# Patient Record
Sex: FEMALE | Race: WHITE | Employment: STUDENT | URBAN - METROPOLITAN AREA
[De-identification: names, ages, dates, MRNs, and addresses within clinical notes are randomized per-mention and may not be internally consistent; named-entity substitution may affect disease eponyms.]

---

## 2017-02-22 DIAGNOSIS — F32.A ANXIETY AND DEPRESSION: ICD-10-CM

## 2017-02-22 DIAGNOSIS — F41.9 ANXIETY AND DEPRESSION: ICD-10-CM

## 2017-02-24 RX ORDER — FLUOXETINE HYDROCHLORIDE 20 MG/1
CAPSULE ORAL
Qty: 90 CAP | Refills: 1 | Status: SHIPPED | OUTPATIENT
Start: 2017-02-24

## 2018-08-06 ENCOUNTER — OFFICE VISIT (OUTPATIENT)
Dept: DERMATOLOGY | Facility: AMBULATORY SURGERY CENTER | Age: 23
End: 2018-08-06

## 2018-08-06 VITALS
BODY MASS INDEX: 26.37 KG/M2 | SYSTOLIC BLOOD PRESSURE: 100 MMHG | WEIGHT: 174 LBS | HEART RATE: 74 BPM | HEIGHT: 68 IN | RESPIRATION RATE: 12 BRPM | DIASTOLIC BLOOD PRESSURE: 60 MMHG | OXYGEN SATURATION: 97 %

## 2018-08-06 DIAGNOSIS — B35.3 TINEA PEDIS, LEFT: ICD-10-CM

## 2018-08-06 DIAGNOSIS — D48.5 NEOPLASM OF UNCERTAIN BEHAVIOR OF SKIN OF NECK: ICD-10-CM

## 2018-08-06 DIAGNOSIS — D22.9 MULTIPLE BENIGN NEVI: Primary | ICD-10-CM

## 2018-08-06 DIAGNOSIS — D48.5 NEOPLASM OF UNCERTAIN BEHAVIOR OF SKIN OF CHEST: ICD-10-CM

## 2018-08-06 RX ORDER — GLYCOPYRROLATE 2 MG/1
TABLET ORAL
COMMUNITY
Start: 2018-07-25

## 2018-08-06 NOTE — MR AVS SNAPSHOT
[facility] [address] 
[phone] Patient: Ilsa Jacobs MRN: [id number] Visit Information Date & Time Provider Department Dept. Phone Encounter #  
 8/6/2018  9:30 AM STEVEN Elise 8057 [phone] [id number] Upcoming Health Maintenance Date Due  
 PAP AKA CERVICAL CYTOLOGY 4/23/2017 Influenza Age 5 to Adult 8/1/2018 DTaP/Tdap/Td series (7 - Td) 7/9/2019 Allergies as of 8/6/2018  Review Complete On: 8/6/2018 By: Mike Griffith No Known Allergies Current Immunizations  Reviewed on 9/14/2015 Name Date DTaP 7/18/2001, 11/13/1997, 1996, 1996, 1996 HPV (9-valent) 7/7/2016, 9/2/2015 HPV (Quad) 8/15/2014 Hep A Vaccine 7/9/2009 Hep A Vaccine 2 Dose Schedule (Ped/Adol) 8/15/2014 Hep B Vaccine 1996, 1996, 1996 Hib 4/24/1997, 1996, 1996, 1996 MMR 8/18/2001, 4/24/1997 Meningococcal (MCV4P) Vaccine 8/15/2014 Meningococcal ACWY Vaccine 7/9/2009 Poliovirus vaccine 7/18/2000, 1996, 1996, 1996 Tdap 7/9/2009 Varicella Virus Vaccine 7/9/2009, 4/24/1997 Not reviewed this visit Vitals BP Pulse Resp Height(growth percentile) Weight(growth percentile) SpO2  
 100/60 (BP 1 Location: Left arm, BP Patient Position: Sitting) 74 12 5' 7.5\" (1.715 m) 174 lb (78.9 kg) 97% BMI OB Status Smoking Status 26.85 kg/m2 Having regular periods Never Smoker BMI and BSA Data Body Mass Index Body Surface Area  
 26.85 kg/m 2 1.94 m 2 Preferred Pharmacy Pharmacy Name Phone [facility], [address] [phone] Your Updated Medication List  
  
   
This list is accurate as of 8/6/18  9:38 AM.  Always use your most recent med list.  
  
  
  
  
 aluminum chloride 20 % external solution Commonly known as:  DRYSOL Prescription anti-perspirant:  Apply as directed nightly for one week. Wash off in morning. fexofenadine 180 mg tablet Commonly known as:  Rae Holstein Take 1 Tab by mouth daily. FLUoxetine 20 mg capsule Commonly known as:  PROzac TAKE ONE CAPSULE BY MOUTH ONCE DAILY  
  
 glycopyrrolate 2 mg tablet  
  
 ibuprofen 600 mg tablet Commonly known as:  MOTRIN Take 1 Tab by mouth every eight (8) hours as needed for Pain (headache). LO LOESTRIN FE 1 mg-10 mcg (24)/10 mcg (2) Tab Generic drug:  norethindrone-e.estradiol-iron Take  by mouth. ondansetron 4 mg disintegrating tablet Commonly known as:  ZOFRAN ODT Take 1 Tab by mouth every eight (8) hours as needed for Nausea (headache). Patient Instructions Self Skin Exam and Sunscreens Early detection and treatment is essential in the treatment of all forms of skin cancer. If caught early, all forms of skin cancer are curable. In addition to your regular visits, you should perform a monthly skin examination. Over time, you become familiar with what is normally found on your skin and can identify new or suspicious spots. One of the screening tools you can use to assess your skin is to follow the ABCDEs: 
 
A= Asymmetry (One half is unlike the other half) B= Border (An irregular, scalloped or poorly defined edge) C= Color (Is varied from one area to another, has shades of tan, brown/ black,       white, red or blue) D= Diameter (Spots larger than 6mm or a pencil eraser) E= Evolving (New spots or one that is changing in size, shape, or color) A follow- up interval will be customized based on your history of skin cancer or level of skin damage and risk factors. In any case, if you notice a suspicious or new spot, an appointment should be arranged between regular visits. Everyone should use sunscreen and sun-safe practices, which is especially important for those with a personal or family history of skin cancer. Suggestions for this include: 1. Use daily moisturizers containing SPF 30 or higher. 2. Wear long sleeve clothing with UPF ratings and a broad-brimmed hat. 3. Apply sunscreen with SPF 30 or higher to all sun exposed areas if you are going to be in the sun. A broad spectrum UVA/ UVB sunscreen is best.  Dont forget to REAPPLY every two hours or more often if swimming or sweating! 4. Avoid outside activities during peak sun hours, especially in the summer (10am- 2pm). 5. DO NOT use tanning beds. Using sunscreen and sun-safe practices can help reduce the likelihood of developing skin cancer or additional skin cancers in those previously diagnosed. Introducing [facility]! Dear Triston Chester: Thank you for requesting a Endoc[employer] account. Our records indicate that you already have an active Endoclear account. You can access your account anytime at [email or web address] Did you know that you can access your hospital and ER discharge instructions at any time in Endoclear? You can also review all of your test results from your hospital stay or ER visit. Additional Information If you have questions, please visit the Frequently Asked Questions section of the Endoclear website at [email or web address] Remember, Endoclear is NOT to be used for urgent needs. For medical emergencies, dial 911. Now available from your iPhone and Android! Please provide this summary of care documentation to your next provider. Your primary care clinician is listed as [facility]. If you have any questions after today's visit, please call [phone].

## 2018-08-06 NOTE — PROGRESS NOTES
Written by Nieves Costa, as dictated by Cathie Bryan, [address]. Name: Javier De La Fuente       Age: 26 y.o. Date: 8/6/2018    Chief Complaint:   Chief Complaint   Patient presents with    Skin Exam       Subjective:    HPI  Ms. Javier De La Fuente is a 26 y.o. female who presents for a partial skin exam.  The patient's last skin exam was on 07/28/16 and the patient does have current complaints related to her skin. She has multiple nevi that she reports are itchy and she scratches until they bleed on her right chest, left lower leg, and left neck. She also reports dry and peeling skin in between her toes. The patient's pertinent skin history includes : nevus excision upper back x1 - benign per patient report. ROS: Constitutional: Negative. Dermatological : positive for - skin lesion changes    Social History     Social History    Marital status: SINGLE     Spouse name: N/A    Number of children: N/A    Years of education: N/A     Occupational History    Not on file. Social History Main Topics    Smoking status: Never Smoker    Smokeless tobacco: Never Used    Alcohol use No    Drug use: No    Sexual activity: Yes     Partners: Male     Birth control/ protection: Implant     Other Topics Concern    Not on file     Social History Narrative       Family History   Problem Relation Age of Onset    Breast Cancer Maternal Grandmother     Colon Cancer Maternal Grandfather     Arthritis-rheumatoid Paternal Grandmother        Past Medical History:   Diagnosis Date    Family history of skin cancer        No past surgical history on file. Current Outpatient Prescriptions   Medication Sig Dispense Refill    FLUoxetine (PROZAC) 20 mg capsule TAKE ONE CAPSULE BY MOUTH ONCE DAILY 90 Cap 1    aluminum chloride (DRYSOL) 20 % external solution Prescription anti-perspirant:  Apply as directed nightly for one week. Wash off in morning.  60 mL 2    ibuprofen (MOTRIN) 600 mg tablet Take 1 Tab by mouth every eight (8) hours as needed for Pain (headache). 50 Tab 1    ondansetron (ZOFRAN ODT) 4 mg disintegrating tablet Take 1 Tab by mouth every eight (8) hours as needed for Nausea (headache). 10 Tab 2    fexofenadine (ALLEGRA) 180 mg tablet Take 1 Tab by mouth daily. 30 Tab 3       No Known Allergies      Objective:    Visit Vitals    /60 (BP 1 Location: Left arm, BP Patient Position: Sitting)    Pulse 74    Resp 12    Ht 5' 7.5\" (1.715 m)    Wt 78.9 kg (174 lb)    SpO2 97%    BMI 26.85 kg/m2       Eulalia Harper is a 26 y.o. female who appears well and in no distress. She is awake, alert, and oriented. There is no preauricular, submandibular, or cervical lymphadenopathy. A skin examination was performed including her scalp, face (including eyelids), ears, neck, chest, abdomen, upper extremities (including digits/nails), lower extremities, breasts,; genital skin was not examined. She has two pink and brown macules on her left upper arm and posterior left distal thigh both with fried egg appearance, no concerning features. She has pink papule on her right lower chest wall consistent with a nevus, 7x 5 mm. She has a pink and brown papule, 5 x 3 mm on her left neck consistent with a nevus. She has scaling of skin between her toes on the left foot consistent with tinea pedis. Right lower chest wall                             Left neck                          Assessment/Plan:    1. Normal nevi. The diagnosis of normal nevi was reviewed. I discussed sun protection, sunscreen use, the warning signs of skin cancer, the need for self-skin examinations, and the need for regular practitioner exams every 2 years. The patient should follow up sooner as needed if new, changing, or symptomatic skin lesions arise. 2. Neoplasm of uncertain behavior, right lower chest wall. A shave removal was advised to address this lesion.   The procedure was reviewed and verbal and written consent were obtained. The risks of pain, bleeding, infection, recurrence and scar were discussed. I performed the procedure. The site was cleansed and anesthetized with 1% Lidocaine with Epinephrine 1:100,000. A shave removal was performed to remove the lesion in its clinical entirety. Drysol was used for hemostasis. The wound was bandaged and care reviewed. The specimen was sent to pathology. I will contact the patient with the results and any further treatment that may be necessary. 3. Neoplasm of uncertain behavior, left neck. A shave removal was advised to address this lesion. The procedure was reviewed and verbal and written consent were obtained. The risks of pain, bleeding, infection, recurrence and scar were discussed. I performed the procedure. The site was cleansed and anesthetized with 1% Lidocaine with Epinephrine 1:100,000. A shave removal was performed to remove the lesion in its clinical entirety. Drysol was used for hemostasis. The wound was bandaged and care reviewed. The specimen was sent to pathology. I will contact the patient with the results and any further treatment that may be necessary. 4. Tinea pedis. I recommended the use of OTC athlete's foot cream for multiple weeks. This plan was reviewed with the patient and patient agrees. All questions were answered. This scribe documentation was reviewed by me and accurately reflects the examination and decisions made by me. [facility]   OFFICE PROCEDURE PROGRESS NOTE   Chart reviewed for the following:   Lily CASTILLO, [address], have reviewed the History, Physical and updated the Allergic reactions for [address] [place] [address]. TIME OUT performed immediately prior to start of procedure:   Michelle CASTILLO, have performed the following reviews on [address] [place] [address]   prior to the start of the procedure:     * Patient was identified by name and date of birth   * Agreement on procedure being performed was verified   * Risks and Benefits explained to the patient   * Procedure site verified and marked as necessary   * Patient was positioned for comfort   * Consent was signed and verified     Time: 9:50 AM  Date of procedure: 8/6/2018  Procedure performed by: Blanquita Gilbert  Provider assisted by: Emily Abdul LPN   Patient assisted by: self   How tolerated by patient: tolerated the procedure well with no complications   Comments: none

## 2018-08-09 LAB
DX ICD CODE: NORMAL
DX ICD CODE: NORMAL
PATH REPORT.FINAL DX SPEC: NORMAL
PATH REPORT.GROSS SPEC: NORMAL
PATH REPORT.MICROSCOPIC SPEC OTHER STN: NORMAL
PATH REPORT.SITE OF ORIGIN SPEC: NORMAL
PATHOLOGIST NAME: NORMAL
PAYMENT PROCEDURE: NORMAL

## 2018-12-12 ENCOUNTER — OFFICE VISIT (OUTPATIENT)
Dept: INTERNAL MEDICINE CLINIC | Age: 23
End: 2018-12-12

## 2018-12-12 VITALS
TEMPERATURE: 98.4 F | HEART RATE: 79 BPM | RESPIRATION RATE: 18 BRPM | HEIGHT: 68 IN | OXYGEN SATURATION: 100 % | WEIGHT: 183 LBS | SYSTOLIC BLOOD PRESSURE: 110 MMHG | DIASTOLIC BLOOD PRESSURE: 81 MMHG | BODY MASS INDEX: 27.74 KG/M2

## 2018-12-12 DIAGNOSIS — Z11.1 SCREENING-PULMONARY TB: ICD-10-CM

## 2018-12-12 DIAGNOSIS — Z23 ENCOUNTER FOR IMMUNIZATION: ICD-10-CM

## 2018-12-12 DIAGNOSIS — Z00.00 ANNUAL PHYSICAL EXAM: Primary | ICD-10-CM

## 2018-12-12 NOTE — PROGRESS NOTES
HISTORY OF PRESENT ILLNESS  Orval Day is a 26 y.o. female presents for annual physical exam  HPI  Yesterday had gyn annual visit. 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  FLUoxetine (PROZAC) 20 mg capsule TAKE ONE CAPSULE BY MOUTH ONCE DAILY 90 Cap 1    aluminum chloride (DRYSOL) 20 % external solution Prescription anti-perspirant:  Apply as directed nightly for one week. Wash off in morning. 60 mL 2    ibuprofen (MOTRIN) 600 mg tablet Take 1 Tab by mouth every eight (8) hours as needed for Pain (headache). 50 Tab 1    ondansetron (ZOFRAN ODT) 4 mg disintegrating tablet Take 1 Tab by mouth every eight (8) hours as needed for Nausea (headache). 10 Tab 2    fexofenadine (ALLEGRA) 180 mg tablet Take 1 Tab by mouth daily. 30 Tab 3     No current facility-administered medications on file prior to visit. History reviewed. No pertinent surgical history. .  Family History   Problem Relation Age of Onset    Breast Cancer Maternal Grandmother     Colon Cancer Maternal Grandfather     Arthritis-rheumatoid Paternal Grandmother      Review of Systems   Constitutional: Positive for weight loss (intentional). HENT: Negative. Eyes: Negative. Respiratory: Negative. Cardiovascular: Negative. Gastrointestinal: Negative. Genitourinary: Negative. Musculoskeletal: Negative. Skin: Negative. Neurological: Negative. Endo/Heme/Allergies: Negative. Psychiatric/Behavioral: Negative. Visit Vitals  /81 (BP 1 Location: Left arm, BP Patient Position: Sitting)   Pulse 79   Temp 98.4 °F (36.9 °C) (Oral)   Resp 18   Ht 5' 7.5\" (1.715 m)   Wt 183 lb (83 kg)   SpO2 100%   BMI 28.24 kg/m²     Physical Exam   Constitutional: She is oriented to person, place, and time. She appears well-developed and well-nourished. No distress. HENT:   Right Ear: External ear normal.   Left Ear: External ear normal.   Nose: Nose normal.   Mouth/Throat: Oropharynx is clear and moist. No oropharyngeal exudate. Eyes: Conjunctivae are normal. Right eye exhibits no discharge. Left eye exhibits no discharge. Neck: Normal range of motion. Neck supple. Cardiovascular: Normal rate, regular rhythm and normal heart sounds. Pulmonary/Chest: Effort normal and breath sounds normal.   Abdominal: Soft. Bowel sounds are normal.   Lymphadenopathy:     She has no cervical adenopathy. Neurological: She is alert and oriented to person, place, and time. Skin: Skin is warm and dry. No rash noted. She is not diaphoretic. No erythema. Psychiatric: She has a normal mood and affect. Her behavior is normal. Judgment and thought content normal.       ASSESSMENT and PLAN    ICD-10-CM ICD-9-CM    1. Annual physical exam Y09.34 V56.2 METABOLIC PANEL, COMPREHENSIVE      AMB POC URINALYSIS DIP STICK AUTO W/O MICRO      TSH RFX ON ABNORMAL TO FREE T4   2.  Encounter for immunization Z23 V03.89 INFLUENZA VIRUS VAC QUAD,SPLIT,PRESV FREE SYRINGE IM      CO IMMUNIZ ADMIN,1 SINGLE/COMB VAC/TOXOID      MEASLES/MUMPS/RUBELLA IMMUNITY      VZV AB, IGG      HEPATITIS B SURF AB QUANT   3. Screening-pulmonary TB Z11.1 V74.1 AMB POC TUBERCULOSIS, INTRADERMAL (SKIN TEST)     current treatment plan is effective, no change in therapy  lab results and schedule of future lab studies reviewed with patient  reviewed diet, exercise and weight control  cardiovascular risk and specific lipid/LDL goals reviewed  reviewed medications and side effects in detail    Encouraged to continue healthy lifestyle, increase physical activity       Patient voices understanding and acceptance of this advice and will call back if any further questions or concerns. An After Visit Summary was printed and given to the patient.

## 2018-12-12 NOTE — PATIENT INSTRUCTIONS

## 2018-12-12 NOTE — PROGRESS NOTES
Exam room R Bita Vega is a 26 y.o. female  Visit Vitals  /81 (BP 1 Location: Left arm, BP Patient Position: Sitting)   Pulse 79   Temp 98.4 °F (36.9 °C) (Oral)   Resp 18   Ht 5' 7.5\" (1.715 m)   Wt 133 lb 3.2 oz (60.4 kg)   SpO2 100%   BMI 20.55 kg/m²     Chief Complaint   Patient presents with    Complete Physical    Labs     titier    Immunization/Injection     1. Have you been to the ER, urgent care clinic since your last visit? Hospitalized since your last visit? yes, UTI in October[facility]      2. Have you seen or consulted any other health care providers outside of the [address] [place] [address] since your last visit? Include any pap smears or colon screening.  No  Health Maintenance Due   Topic Date Due    PAP AKA CERVICAL CYTOLOGY  04/23/2017    Influenza Age 5 to Adult  08/01/2018     Learning Assessment 7/28/2016   PRIMARY LEARNER Patient   HIGHEST LEVEL OF EDUCATION - PRIMARY LEARNER  -   BARRIERS PRIMARY LEARNER -   PRIMARY LANGUAGE ENGLISH   LEARNER PREFERENCE PRIMARY DEMONSTRATION   LEARNING SPECIAL TOPICS no   ANSWERED BY patient   RELATIONSHIP SELF   ASSESSMENT COMMENT none

## 2018-12-13 ENCOUNTER — TELEPHONE (OUTPATIENT)
Dept: INTERNAL MEDICINE CLINIC | Age: 23
End: 2018-12-13

## 2018-12-13 LAB
ALBUMIN SERPL-MCNC: 4.7 G/DL (ref 3.5–5.5)
ALBUMIN/GLOB SERPL: 1.8 {RATIO} (ref 1.2–2.2)
ALP SERPL-CCNC: 48 IU/L (ref 39–117)
ALT SERPL-CCNC: 6 IU/L (ref 0–32)
AST SERPL-CCNC: 14 IU/L (ref 0–40)
BILIRUB SERPL-MCNC: 1 MG/DL (ref 0–1.2)
BUN SERPL-MCNC: 9 MG/DL (ref 6–20)
BUN/CREAT SERPL: 14 (ref 9–23)
CALCIUM SERPL-MCNC: 9.3 MG/DL (ref 8.7–10.2)
CHLORIDE SERPL-SCNC: 105 MMOL/L (ref 96–106)
CO2 SERPL-SCNC: 22 MMOL/L (ref 20–29)
CREAT SERPL-MCNC: 0.65 MG/DL (ref 0.57–1)
GLOBULIN SER CALC-MCNC: 2.6 G/DL (ref 1.5–4.5)
GLUCOSE SERPL-MCNC: 90 MG/DL (ref 65–99)
HBV SURFACE AB SER-ACNC: 106.6 MIU/ML
MEV IGG SER IA-ACNC: 43.5 AU/ML
MUV IGG SER IA-ACNC: <9 AU/ML
POTASSIUM SERPL-SCNC: 4.1 MMOL/L (ref 3.5–5.2)
PROT SERPL-MCNC: 7.3 G/DL (ref 6–8.5)
RUBV IGG SERPL IA-ACNC: <0.9 INDEX
SODIUM SERPL-SCNC: 140 MMOL/L (ref 134–144)
TSH SERPL DL<=0.005 MIU/L-ACNC: 2.29 UIU/ML (ref 0.45–4.5)
VZV IGG SER IA-ACNC: 845 INDEX

## 2019-03-09 ENCOUNTER — HOSPITAL ENCOUNTER (EMERGENCY)
Age: 24
Discharge: HOME | End: 2019-03-09
Attending: EMERGENCY MEDICINE
Payer: COMMERCIAL

## 2019-03-09 VITALS
HEART RATE: 80 BPM | RESPIRATION RATE: 22 BRPM | OXYGEN SATURATION: 100 % | DIASTOLIC BLOOD PRESSURE: 58 MMHG | SYSTOLIC BLOOD PRESSURE: 117 MMHG | BODY MASS INDEX: 27.28 KG/M2 | WEIGHT: 180 LBS | TEMPERATURE: 98.7 F | HEIGHT: 68 IN

## 2019-03-09 DIAGNOSIS — M79.10 MYALGIA: Primary | ICD-10-CM

## 2019-03-09 LAB
ALBUMIN SERPL-MCNC: 4.2 G/DL (ref 3.5–5)
ALBUMIN/GLOB SERPL: 1.3 {RATIO} (ref 1.1–2.2)
ALP SERPL-CCNC: 61 U/L (ref 45–117)
ALT SERPL-CCNC: 13 U/L (ref 12–78)
ANION GAP SERPL CALC-SCNC: 7 MMOL/L (ref 5–15)
AST SERPL-CCNC: 10 U/L (ref 15–37)
BASOPHILS # BLD: 0 K/UL (ref 0–0.1)
BASOPHILS NFR BLD: 0 % (ref 0–1)
BILIRUB SERPL-MCNC: 1.4 MG/DL (ref 0.2–1)
BUN SERPL-MCNC: 9 MG/DL (ref 6–20)
BUN/CREAT SERPL: 11 (ref 12–20)
CALCIUM SERPL-MCNC: 9.2 MG/DL (ref 8.5–10.1)
CHLORIDE SERPL-SCNC: 105 MMOL/L (ref 97–108)
CO2 SERPL-SCNC: 27 MMOL/L (ref 21–32)
COMMENT, HOLDF: NORMAL
CREAT SERPL-MCNC: 0.82 MG/DL (ref 0.55–1.02)
CRP SERPL-MCNC: <0.29 MG/DL (ref 0–0.6)
DIFFERENTIAL METHOD BLD: ABNORMAL
EOSINOPHIL # BLD: 0.5 K/UL (ref 0–0.4)
EOSINOPHIL NFR BLD: 6 % (ref 0–7)
ERYTHROCYTE [DISTWIDTH] IN BLOOD BY AUTOMATED COUNT: 12.4 % (ref 11.5–14.5)
ERYTHROCYTE [SEDIMENTATION RATE] IN BLOOD: 4 MM/HR (ref 0–20)
FLUAV AG NPH QL IA: NEGATIVE
FLUBV AG NOSE QL IA: NEGATIVE
GLOBULIN SER CALC-MCNC: 3.3 G/DL (ref 2–4)
GLUCOSE SERPL-MCNC: 97 MG/DL (ref 65–100)
HCT VFR BLD AUTO: 43.9 % (ref 35–47)
HETEROPH AB SER QL: NEGATIVE
HGB BLD-MCNC: 14.7 G/DL (ref 11.5–16)
IMM GRANULOCYTES # BLD AUTO: 0 K/UL (ref 0–0.04)
IMM GRANULOCYTES NFR BLD AUTO: 0 % (ref 0–0.5)
LACTATE SERPL-SCNC: 0.8 MMOL/L (ref 0.4–2)
LYMPHOCYTES # BLD: 1.2 K/UL (ref 0.8–3.5)
LYMPHOCYTES NFR BLD: 16 % (ref 12–49)
MCH RBC QN AUTO: 30.4 PG (ref 26–34)
MCHC RBC AUTO-ENTMCNC: 33.5 G/DL (ref 30–36.5)
MCV RBC AUTO: 90.9 FL (ref 80–99)
MONOCYTES # BLD: 0.5 K/UL (ref 0–1)
MONOCYTES NFR BLD: 7 % (ref 5–13)
NEUTS SEG # BLD: 5.1 K/UL (ref 1.8–8)
NEUTS SEG NFR BLD: 71 % (ref 32–75)
NRBC # BLD: 0 K/UL (ref 0–0.01)
NRBC BLD-RTO: 0 PER 100 WBC
PLATELET # BLD AUTO: 255 K/UL (ref 150–400)
PMV BLD AUTO: 9.8 FL (ref 8.9–12.9)
POTASSIUM SERPL-SCNC: 4 MMOL/L (ref 3.5–5.1)
PROT SERPL-MCNC: 7.5 G/DL (ref 6.4–8.2)
RBC # BLD AUTO: 4.83 M/UL (ref 3.8–5.2)
SAMPLES BEING HELD,HOLD: NORMAL
SODIUM SERPL-SCNC: 139 MMOL/L (ref 136–145)
WBC # BLD AUTO: 7.3 K/UL (ref 3.6–11)

## 2019-03-09 PROCEDURE — 86308 HETEROPHILE ANTIBODY SCREEN: CPT

## 2019-03-09 PROCEDURE — 87040 BLOOD CULTURE FOR BACTERIA: CPT

## 2019-03-09 PROCEDURE — 86757 RICKETTSIA ANTIBODY: CPT

## 2019-03-09 PROCEDURE — 96376 TX/PRO/DX INJ SAME DRUG ADON: CPT

## 2019-03-09 PROCEDURE — 74011250636 HC RX REV CODE- 250/636: Performed by: EMERGENCY MEDICINE

## 2019-03-09 PROCEDURE — 85025 COMPLETE CBC W/AUTO DIFF WBC: CPT

## 2019-03-09 PROCEDURE — 74011250636 HC RX REV CODE- 250/636: Performed by: PHYSICIAN ASSISTANT

## 2019-03-09 PROCEDURE — 83605 ASSAY OF LACTIC ACID: CPT

## 2019-03-09 PROCEDURE — 86140 C-REACTIVE PROTEIN: CPT

## 2019-03-09 PROCEDURE — 80053 COMPREHEN METABOLIC PANEL: CPT

## 2019-03-09 PROCEDURE — 96361 HYDRATE IV INFUSION ADD-ON: CPT

## 2019-03-09 PROCEDURE — 99284 EMERGENCY DEPT VISIT MOD MDM: CPT

## 2019-03-09 PROCEDURE — 85652 RBC SED RATE AUTOMATED: CPT

## 2019-03-09 PROCEDURE — 36415 COLL VENOUS BLD VENIPUNCTURE: CPT

## 2019-03-09 PROCEDURE — 96374 THER/PROPH/DIAG INJ IV PUSH: CPT

## 2019-03-09 PROCEDURE — 87804 INFLUENZA ASSAY W/OPTIC: CPT

## 2019-03-09 PROCEDURE — 87476 LYME DIS DNA AMP PROBE: CPT

## 2019-03-09 RX ORDER — TETRACYCLINE HYDROCHLORIDE 250 MG/1
250 CAPSULE ORAL
Qty: 40 CAP | Refills: 0 | Status: SHIPPED | OUTPATIENT
Start: 2019-03-09 | End: 2019-03-09 | Stop reason: ALTCHOICE

## 2019-03-09 RX ORDER — NAPROXEN 500 MG/1
500 TABLET ORAL 2 TIMES DAILY WITH MEALS
Qty: 20 TAB | Refills: 0 | Status: SHIPPED | OUTPATIENT
Start: 2019-03-09 | End: 2019-03-19

## 2019-03-09 RX ORDER — KETOROLAC TROMETHAMINE 30 MG/ML
15 INJECTION, SOLUTION INTRAMUSCULAR; INTRAVENOUS
Status: COMPLETED | OUTPATIENT
Start: 2019-03-09 | End: 2019-03-09

## 2019-03-09 RX ORDER — DOXYCYCLINE HYCLATE 100 MG
100 TABLET ORAL 2 TIMES DAILY
Qty: 20 TAB | Refills: 0 | Status: SHIPPED | OUTPATIENT
Start: 2019-03-09 | End: 2019-03-19

## 2019-03-09 RX ADMIN — KETOROLAC TROMETHAMINE 15 MG: 30 INJECTION, SOLUTION INTRAMUSCULAR; INTRAVENOUS at 14:13

## 2019-03-09 RX ADMIN — SODIUM CHLORIDE 1000 ML: 900 INJECTION, SOLUTION INTRAVENOUS at 13:34

## 2019-03-09 RX ADMIN — KETOROLAC TROMETHAMINE 15 MG: 30 INJECTION, SOLUTION INTRAMUSCULAR; INTRAVENOUS at 13:32

## 2019-03-09 NOTE — ED TRIAGE NOTES
Triage note: Pt arrives with c/o headache, nausea, generalized body aches, and neck stiffness x 1 week. Pt dx with strep throat by PCP and finished course of antibiotics yesterday. Pt states she had blood work done at PCP for [facility] fever.

## 2019-03-09 NOTE — ED NOTES
12:53 PM  I have evaluated the patient as the Provider in Triage. I have reviewed Her vital signs and the triage nurse assessment. I have talked with the patient and any available family and advised that I am the provider in triage and have ordered the appropriate study to initiate their work up based on the clinical presentation during my assessment. I have advised that the patient will be accommodated in the Main ED as soon as possible. I have also requested to contact the triage nurse or myself immediately if the patient experiences any changes in their condition during this brief waiting period. Patient with body aches x1 month, followed by sore throat 1.5 weeks ago. Diagnosed with strep throat by her PCP last Tuesday and treated with amoxicillin (finished yesterday). Also had elevated RMSF titers (IgM only, not IgG) and is supposed to go back this Tuesday for repeat titers. Over the past 1.5 weeks onset of headache, neck pain, neck stiffness, fatigue and nausea. Pain with neck ROM on exam, pain with neck flexion. Patient moved to core ED for further treatment.   ANDREA Nolan

## 2019-03-09 NOTE — ED PROVIDER NOTES
26 y.o. female with past medical history significant for anxiety who presents ambulatory from home accompanied by mother with chief complaint of headache. Patient reports body aches and arthralgias since a month ago. She notes onset of migraines 1 week ago, mostly on the left side but occasionally on the right side and on the back when she lies down. She also complains of nausea, shortness of breath, and fatigue. Of note, patient was evaluated by her PCP and was diagnosed with strep throat. She finished Amoxicillin yesterday. She was also tested for [facility] spotted fever and states that her IgM antibodies were elevated. She notes that she has another appointment with her PCP on 3/12/19 to be tested again. Patient denies being bitten by ticks. Of note, patient denies travelling outside the country in the last month. Pertinent negatives include fever, cough, congestion, vomiting, rash, and neck pain. There are no other acute medical concerns at this time. Social hx: Denies tobacco use; denies alcohol use; denies illicit drug use  PCP: Jona Garrett MD    Note written by Jayne Bowers, as dictated by Estephania Mcaias MD 1:56PM               Past Medical History:   Diagnosis Date    Anxiety     Family history of skin cancer        History reviewed. No pertinent surgical history.       Family History:   Problem Relation Age of Onset    Breast Cancer Maternal Grandmother     Colon Cancer Maternal Grandfather     Arthritis-rheumatoid Paternal Grandmother        Social History     Socioeconomic History    Marital status: SINGLE     Spouse name: Not on file    Number of children: Not on file    Years of education: Not on file    Highest education level: Not on file   Social Needs    Financial resource strain: Not on file    Food insecurity - worry: Not on file    Food insecurity - inability: Not on file   pic5 needs - medical: Not on file   pic5 needs - non-medical: Not on file   Occupational History    Not on file   Tobacco Use    Smoking status: Never Smoker    Smokeless tobacco: Never Used   Substance and Sexual Activity    Alcohol use: No     Alcohol/week: 0.0 oz    Drug use: No    Sexual activity: Yes     Partners: Male     Birth control/protection: Implant, Pill   Other Topics Concern    Not on file   Social History Narrative    Not on file         ALLERGIES: Patient has no known allergies. Review of Systems   Constitutional: Positive for fatigue. Negative for activity change, appetite change and fever. HENT: Negative for congestion, ear pain, facial swelling, sore throat and trouble swallowing. Eyes: Negative for pain, discharge and visual disturbance. Respiratory: Positive for shortness of breath. Negative for cough, chest tightness and wheezing. Cardiovascular: Negative for chest pain and palpitations. Gastrointestinal: Positive for nausea. Negative for abdominal pain, blood in stool and vomiting. Genitourinary: Negative for difficulty urinating, flank pain and hematuria. Musculoskeletal: Positive for arthralgias and myalgias. Negative for joint swelling and neck pain. Skin: Negative for color change and rash. Neurological: Positive for headaches. Negative for dizziness, weakness and numbness. Hematological: Negative for adenopathy. Does not bruise/bleed easily. Psychiatric/Behavioral: Negative for behavioral problems, confusion and sleep disturbance. All other systems reviewed and are negative. Vitals:    03/09/19 1236 03/09/19 1330   BP: 99/62 113/62   Pulse: (!) 126 (!) 103   Resp: 16 15   Temp: 98.8 °F (37.1 °C) 99.8 °F (37.7 °C)   SpO2: 100% 99%   Weight: 81.6 kg (180 lb)    Height: 5' 8\" (1.727 m)             Physical Exam   Constitutional: She is oriented to person, place, and time. She appears well-developed and well-nourished. No distress. HENT:   Head: Normocephalic and atraumatic.    Nose: Nose normal. Mouth/Throat: Oropharynx is clear and moist.   Eyes: Conjunctivae and EOM are normal. Pupils are equal, round, and reactive to light. No scleral icterus. Neck: Normal range of motion. Neck supple. No JVD present. No tracheal deviation present. No thyromegaly present. No carotid bruits noted. No nuchal rigidity    Cardiovascular: Normal rate, regular rhythm, normal heart sounds and intact distal pulses. Exam reveals no gallop and no friction rub. No murmur heard. Pulmonary/Chest: Effort normal and breath sounds normal. No respiratory distress. She has no wheezes. She has no rales. She exhibits no tenderness. Abdominal: Soft. Bowel sounds are normal. She exhibits no distension and no mass. There is no tenderness. There is no rebound and no guarding. Musculoskeletal: Normal range of motion. She exhibits no edema or tenderness. Lymphadenopathy:     She has no cervical adenopathy. Neurological: She is alert and oriented to person, place, and time. She has normal reflexes. No cranial nerve deficit. Coordination normal.   Skin: Skin is warm and dry. No rash noted. No erythema. Psychiatric: She has a normal mood and affect. Her behavior is normal. Judgment and thought content normal.   Nursing note and vitals reviewed. Note written by Jayne Baker, as dictated by Olamide Hazel MD 1:56 PM      MDM  Number of Diagnoses or Management Options     Amount and/or Complexity of Data Reviewed  Clinical lab tests: ordered and reviewed  Decide to obtain previous medical records or to obtain history from someone other than the patient: yes  Obtain history from someone other than the patient: yes  Review and summarize past medical records: yes    Risk of Complications, Morbidity, and/or Mortality  Presenting problems: moderate  Diagnostic procedures: moderate  Management options: moderate    Patient Progress  Patient progress: stable         Procedures  PROGRESS NOTE:  3:02 PM  Waiting for labs. Labs unremarkable. No clear etiology for symptoms. Inflammatory markers are normal. WBC is normal. Will continue to treat symptomatically and have the patient follow up with own MD for continued difficulty.

## 2019-03-12 LAB
B BURGDOR DNA SPEC QL NAA+PROBE: NEGATIVE
R RICKETTSI IGG SER QL IA: NEGATIVE
R RICKETTSI IGM SER-ACNC: 0.85 INDEX (ref 0–0.89)
SPECIMEN SOURCE: NORMAL

## 2019-03-14 LAB
BACTERIA SPEC CULT: NORMAL
SERVICE CMNT-IMP: NORMAL

## 2023-05-25 RX ORDER — GLYCOPYRROLATE 2 MG/1
TABLET ORAL
COMMUNITY
Start: 2018-07-25

## 2023-05-25 RX ORDER — FLUOXETINE HYDROCHLORIDE 20 MG/1
1 CAPSULE ORAL DAILY
COMMUNITY
Start: 2017-02-24

## 2023-05-25 RX ORDER — ONDANSETRON 4 MG/1
4 TABLET, ORALLY DISINTEGRATING ORAL EVERY 8 HOURS PRN
COMMUNITY
Start: 2016-02-09

## 2023-05-25 RX ORDER — FEXOFENADINE HCL 180 MG/1
180 TABLET ORAL DAILY
COMMUNITY
Start: 2014-04-04

## 2023-05-25 RX ORDER — IBUPROFEN 600 MG/1
600 TABLET ORAL EVERY 8 HOURS PRN
COMMUNITY
Start: 2016-02-09